# Patient Record
Sex: MALE | Race: WHITE | Employment: FULL TIME | URBAN - METROPOLITAN AREA
[De-identification: names, ages, dates, MRNs, and addresses within clinical notes are randomized per-mention and may not be internally consistent; named-entity substitution may affect disease eponyms.]

---

## 2017-02-15 PROBLEM — M54.32 CHRONIC SCIATICA OF LEFT SIDE: Status: ACTIVE | Noted: 2017-02-15

## 2017-02-16 ENCOUNTER — HOSPITAL ENCOUNTER (OUTPATIENT)
Dept: GENERAL RADIOLOGY | Age: 37
Discharge: HOME | End: 2017-02-16
Payer: COMMERCIAL

## 2017-02-16 DIAGNOSIS — M54.32 CHRONIC SCIATICA OF LEFT SIDE: ICD-10-CM

## 2017-02-16 PROCEDURE — 72100 X-RAY EXAM L-S SPINE 2/3 VWS: CPT

## 2017-02-17 NOTE — PROGRESS NOTES
Let know mild degenerative changes at L5-S1. Otherwise, looks ok.   If not better, pls get in with PT.

## 2017-03-16 ENCOUNTER — HOSPITAL ENCOUNTER (OUTPATIENT)
Dept: PHYSICAL THERAPY | Age: 37
Discharge: HOME | End: 2017-03-16
Payer: COMMERCIAL

## 2017-03-16 DIAGNOSIS — G89.29 CHRONIC LEFT-SIDED LOW BACK PAIN WITH LEFT-SIDED SCIATICA: ICD-10-CM

## 2017-03-16 DIAGNOSIS — M54.42 CHRONIC LEFT-SIDED LOW BACK PAIN WITH LEFT-SIDED SCIATICA: ICD-10-CM

## 2017-03-16 DIAGNOSIS — M54.16 LUMBAR RADICULOPATHY: ICD-10-CM

## 2017-03-16 DIAGNOSIS — R29.898 WEAKNESS OF LEFT LOWER EXTREMITY: ICD-10-CM

## 2017-03-16 PROCEDURE — 97110 THERAPEUTIC EXERCISES: CPT | Performed by: PHYSICAL THERAPIST

## 2017-03-16 PROCEDURE — 97014 ELECTRIC STIMULATION THERAPY: CPT | Performed by: PHYSICAL THERAPIST

## 2017-03-16 PROCEDURE — 97162 PT EVAL MOD COMPLEX 30 MIN: CPT | Performed by: PHYSICAL THERAPIST

## 2017-03-16 PROCEDURE — 97140 MANUAL THERAPY 1/> REGIONS: CPT | Performed by: PHYSICAL THERAPIST

## 2017-03-16 NOTE — PROGRESS NOTES
[facility]  : 1980 [address] [place] [address].  [phone]   [phone]        OUTPATIENT PHYSICAL THERAPY:Initial Assessment 3/16/2017      ICD-10: Treatment Diagnosis: Low back pain (M54.5)  Radiculopathy, lumbar region (M54.16) Left  Precautions/Allergies:   Review of patient's allergies indicates no known allergies. Fall Risk Score: 2 (? 5 = High Risk)  MD Orders: Evaluate and treat low back pain with L-sided sciatica MEDICAL/REFERRING DIAGNOSIS:  Chronic left-sided low back pain with left-sided sciatica [M54.42, G89.29]  Lumbar radiculopathy [M54.16]  Weakness of left lower extremity [M62.81]   DATE OF ONSET: ~2 months ago (2017)  REFERRING PHYSICIAN: DO MARTINEZ Bautista PHYSICIAN APPOINTMENT: TBD     INITIAL ASSESSMENT:  Mr. Bacon is a 40 y/o male presenting with low back pain with L radicular pain into L buttocks. Pt presents with pain and difficulty with prolonged sitting, standing, transfers, and initially walking after transfer. Pt exhibits limited trunk and L hip mobility with decreased LLE strength. Pt will benefit from aquatic therapy to include deep well traction and then increase functional mobility and increase core/hip/LE strength in a setting that will decompress spine. PROBLEM LIST (Impacting functional limitations):  1. Decreased Strength  2. Decreased ADL/Functional Activities  3. Decreased Transfer Abilities  4. Decreased Ambulation Ability/Technique  5. Increased Pain  6. Decreased Activity Tolerance  7. Decreased Flexibility/Joint Mobility  8. Decreased [place] with Home Exercise Program INTERVENTIONS PLANNED:  1. Balance Exercise  2. Cold  3. Gait Training  4. Heat  5. Home Exercise Program (HEP)  6. Manual Therapy  7. Neuromuscular Re-education/Strengthening  8. Range of Motion (ROM)  9. Therapeutic Activites  10. Therapeutic Exercise/Strengthening  11.  Transcutaneous Electrical Nerve Stimulation (TENS)  12. Transfer Training  13. Ultrasound (US)  14. aquatics   TREATMENT PLAN:  Effective Dates: 3/16/17 TO 06/08/17. Frequency/Duration: 2-3 times a week for 12 weeks  GOALS: (Goals have been discussed and agreed upon with patient.)  Short-Term Functional Goals: Time Frame: 3 weeks  1. Pt will be able to independent with HEP without increased symptoms. 2. Pt will be able to sit to stand without use of UEs. 3. Pt will be able to report at least 25% less symptoms with back and left buttock pain with daily activities. 4. Pt will be able to sit to stand then ambulate with normalized gait (per report) after sitting to eat for 30 minutes. Discharge Goals: Time Frame: 8-12 weeks  1. Pt will be able to sit in his truck for an hour and then transfer out with symptoms 0-1/10 symptoms. 2. Pt will be able to increase LLE strength=RLE strength for lifting and squatting. 3. Pt will be able to increase LLE straight leg raise to at least 70 ° noting improving functional mobility. 4. Pt will be able to increase trunk AROM to [facility] for ADLS, donning shoes, etc.   Rehabilitation Potential For Stated Goals: Good  Regarding Jean Lui's therapy, I certify that the treatment plan above will be carried out by a therapist or under their direction. Thank you for this referral,  Emma Brian, PT       Referring Physician Signature: Kemi Manning DO              Date                      HISTORY:   History of Present Injury/Illness (Reason for Referral):  Pt reports insidious onset of LBP about 2 months ago. Pt does recall feeling increased pain while playing basketball. He felt like he hyperextended his L knee and felt pain in buttock area. Pt reports severe pain in L buttock area that is a pulling, burning sensation. Pt denies radiation past knee. Pt reports pain increases with sitting, standing, and is really bad while moving sit to stand after sitting for any length of time.   Pt reports that medicine has not changed his pain. Pt reports no previous pain like this in the past; however, pt has had episodes of what he describes as hip pain (back pain) causing postural shifts. Pt states with stretching and/or chiropractor he has worked through those episodes. Past Medical History/Comorbidities:   Mr. Sepideh Campbell  has a past medical history of Chronic sciatica of left side (2/15/2017). Mr. Sepideh Campbell  has no past surgical history on file. 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 , Disp: 60 Tab, Rfl: 2    tiZANidine (ZANAFLEX) 4 mg tablet, Take 1 Tab by mouth nightly as needed. , Disp: 30 Tab, Rfl: 1   Date Last Reviewed:  3/16/2017   # of Personal Factors/Comorbidities that affect the Plan of Care: 1-2: MODERATE COMPLEXITY   EXAMINATION:   Observation/Orthostatic Postural Assessment:  Decreased lumbar lordosis mildly; guarded with trunk          Palpation:          Tight and tender with L piriformis; no pain with palpation of hamstrings, mild pain with palpation L5-S1 area L   ROM:      Trunk flexion to knees, guarded and limited by pain  Trunk extension increases pain L buttock and guarded and limited with pain in standing but WFL in prone on table  Trunk sidebending WFL  Trunk Rotation to L painful, mild limitiationbut WNL to R    Hips WFL except L hamstring limited to    40 ° with Straight Leg Raise (SLR); R SLR 75 °    Knees and ankles WFL                  Strength:     Date:  3/16/17 Date:   Date:     LE MMT Left Right Left Right Left Right   Hip Flex (L1/L2) 4-/5 pain  5/5       Hip Abd (glut med) 4-/5  5/5       Quad    ( L3/4) 4/5 but very difficult to perform LAQ due to pain 5/5       Hamstring 4+/5 5/5       Anterior Tib (L4/L5) 5/5 5/5       Gastroc (S1/2)         EHL (L5) 5/5 5/5         Special Tests:  SLR (+ L), Hip Scour (-), LILY (-), Slump (+ L)  Neurological Screen: denies numbness and tingling but burning L buttock and proximal posterior L thigh  Functional Mobility:         Gait/Ambulation:  Upon transfer sit to stand, first few steps antalgic, guarded trunk and decreased stance time LLE        Transfers:  Slow and requires use of hands but Mod I  Balance:  WFL with SLS   Body Structures Involved:  1. Nerves  2. Bones  3. Joints  4. Muscles  5. Ligaments Body Functions Affected:  1. Sensory/Pain  2. Neuromusculoskeletal  3. Movement Related Activities and Participation Affected:  1. General Tasks and Demands  2. Mobility  3. Self Care  4. Domestic Life  5. Community, Social and [place]   # of elements that affect the Plan of Care: 4+: HIGH COMPLEXITY   CLINICAL PRESENTATION:   Presentation: Evolving clinical presentation with changing clinical characteristics: MODERATE COMPLEXITY   CLINICAL DECISION MAKING:   Outcome Measure: Tool Used: Modified Oswestry Low Back Pain Questionnaire  Score:  Initial: 8/50  Most Recent: X/50 (Date: -- )   Interpretation of Score: Each section is scored on a 0-5 scale, 5 representing the greatest disability. The scores of each section are added together for a total score of 50. Medical Necessity:   · Patient is expected to demonstrate progress in strength, range of motion and functional technique to decrease assistance required with transfers. Reason for Services/Other Comments:  · Patient continues to require skilled intervention due to needing further therapy to decompress spine and further instruction and advancements of core/LE strengthening and stretching.    Use of outcome tool(s) and clinical judgement create a POC that gives a: Questionable prediction of patient's progress: MODERATE COMPLEXITY   TREATMENT:   (In addition to Assessment/Re-Assessment sessions the following treatments were rendered)    Therapeutic Exercise: (see flow sheet below for minutes):  Exercises per grid below to improve mobility and strength. Required minimal visual and verbal cues to promote proper body mechanics. Aquatic Therapy (see flow sheet below for minutes): Aquatic treatment performed per flow grid for Decreased muscle strength, Decreased range of motion and Decompression. Cues provided for technique. Date: 3/16/17       Modalities: 15 minutes       Ice/ pre-mod 2 channels e-stim L lumbar and L piriformis area 15 minutes pt in prone                       Manual Therapy: 10 minutes       Soft tissue mobilization L piriformis  10 minutes; pt in prone with pillows under abdomen                       Aquatic Exercise: next       Gait F/B/S/M        Heel/Toe walk        4-way        PF/DF        Hamstring Curls        Hip Flexion with knee ext.   (rockette)        Squats          SLR march        Lunges        Hip circles        Hip horizontal abduction/adduction        Core:          Core:        Deep well bike        Deep well jumping eugene        Deep well scissors        Deep well:  traction                Hamstring Stretch        Step Lunge        Piriformis stretch        PF Stretch        Balance: Single leg Stance        Balance: Tandem                          Therapeutic Exercise: 20 minutes       Lower trunk rotations x15       Single knee to chest 2 H 20       Figure 4 piriformis 3 H 20       Supine hamstring active stretch 2 H 30       Nerve glide with LLE SLR with ankle pumps using strap 2 H 20       Pelvic tilts x15       bridging x15       Prone press-ups 5 H 10               F=forward  B=Backward  S=sidesteps  M=marches    H=hold    Manual: (see flow sheet above for minutes) see above to decrease tightness with L piriformis and increase flexibility  Modalities: (see flow sheet above for minutes) see above to decrease pain and inflammation; pt monitored and skin WNL afterwards    HEP: see above and pt issued written HEP  Treatment/Session Assessment:  Pt was seen for initial evaluation followed by treatment above. Pt had decreased symptoms with stretching and manual therapy. Pt will benefit from deep well aquatic traction next session and pt agreeable. · Pain/ Symptoms: Initial:   7/10 L buttock/posterior thigh burning and tightness worse after sitting and standing Post Session:  \"I feel less pain after I've stretched. Reports minimal pain upon leaving. ·   Compliance with Program/Exercises: Will assess as treatment progresses. · Recommendations/Intent for next treatment session: \"Next visit will focus on beginning aquatics\".   Total Treatment Duration:  PT Patient Time In/Time Out  Time In: 1500  Time Out: 1610     Emma Brian, PT

## 2017-03-16 NOTE — PROGRESS NOTES
Ambulatory/Rehab Services H2 Model Falls Risk Assessment    Risk Factor Pts. ·   Confusion/Disorientation/Impulsivity  []    4 ·   Symptomatic Depression  []   2 ·   Altered Elimination  []   1 ·   Dizziness/Vertigo  []   1 ·   Gender (Male)  [x]   1 ·   Any administered antiepileptics (anticonvulsants):  []   2 ·   Any administered benzodiazepines:  []   1 ·   Visual Impairment (specify):  []   1 ·   Portable Oxygen Use  []   1 ·   Orthostatic ? BP  []   1 ·   History of Recent Falls (within 3 mos.)  []   5     Ability to Rise from Chair (choose one) Pts. ·   Ability to rise in a single movement  []   0 ·   Pushes up, successful in one attempt  [x]   1 ·   Multiple attempts, but successful  []   3 ·   Unable to rise without assistance  []   4   Total: (5 or greater = High Risk) 2     Falls Prevention Plan:   []                Physical Limitations to Exercise (specify):   []                Mobility Assistance Device (type):   []                Exercise/Equipment Adaptation (specify):    ©2010 [facility] [address] Patent #[id number].  Federal Law prohibits the replication, distribution or use without written permission from [facility]

## 2017-03-30 ENCOUNTER — HOSPITAL ENCOUNTER (OUTPATIENT)
Dept: MRI IMAGING | Age: 37
Discharge: HOME | End: 2017-03-30
Attending: FAMILY MEDICINE
Payer: COMMERCIAL

## 2017-03-30 DIAGNOSIS — M54.42 CHRONIC LEFT-SIDED LOW BACK PAIN WITH LEFT-SIDED SCIATICA: ICD-10-CM

## 2017-03-30 DIAGNOSIS — M54.16 LUMBAR RADICULOPATHY: ICD-10-CM

## 2017-03-30 DIAGNOSIS — R29.898 WEAKNESS OF LEFT LOWER EXTREMITY: ICD-10-CM

## 2017-03-30 DIAGNOSIS — G89.29 CHRONIC LEFT-SIDED LOW BACK PAIN WITH LEFT-SIDED SCIATICA: ICD-10-CM

## 2017-03-30 PROCEDURE — 72148 MRI LUMBAR SPINE W/O DYE: CPT

## 2017-03-31 NOTE — PROGRESS NOTES
Let know:  1. Moderate size left subarticular zone disc extrusion at L5-S1 with severe  impingement upon the left S1 nerve root. There is also mild right neural  foraminal narrowing at this level. 2. Disc herniations also at L3-4 and L4-5 as described. There is resultant mild  central canal stenosis at the L3-4 level. --Please get in with Dr. Jose Guadalupe Higuera (neurosurgeon) to discuss tx options.

## 2017-04-11 NOTE — PROGRESS NOTES
Donna Jaimes Arleen  : 1980 [address] [facility],[address] @ [address]  [facility] [address]  [phone]   [phone]        OUTPATIENT PHYSICAL THERAPY:Discontinuation Summary 2017      ICD-10: Treatment Diagnosis: Low back pain (M54.5)  Radiculopathy, lumbar region (M54.16) Left  Precautions/Allergies:   Review of patient's allergies indicates no known allergies. Fall Risk Score: 2 (? 5 = High Risk)  MD Orders: Evaluate and treat low back pain with L-sided sciatica MEDICAL/REFERRING DIAGNOSIS:  Chronic left-sided low back pain with left-sided sciatica [M54.42, G89.29]  Lumbar radiculopathy [M54.16]  Weakness of left lower extremity [M62.81]   DATE OF ONSET: ~2 months ago (2017)  REFERRING PHYSICIAN: DO MARTINEZ Sawyer PHYSICIAN APPOINTMENT: TBD     INITIAL ASSESSMENT:  Mr. Bacon is a 40 y/o male presenting with low back pain with L radicular pain into L buttocks. Pt presents with pain and difficulty with prolonged sitting, standing, transfers, and initially walking after transfer. Pt exhibits limited trunk and L hip mobility with decreased LLE strength. Pt will benefit from aquatic therapy to include deep well traction and then increase functional mobility and increase core/hip/LE strength in a setting that will decompress spine. 17: Pt was seen for initial evaluation and did not return for further visits. Discontinue at this time. PROBLEM LIST (Impacting functional limitations):  1. Decreased Strength  2. Decreased ADL/Functional Activities  3. Decreased Transfer Abilities  4. Decreased Ambulation Ability/Technique  5. Increased Pain  6. Decreased Activity Tolerance  7. Decreased Flexibility/Joint Mobility  8. Decreased [place] with Home Exercise Program INTERVENTIONS PLANNED:  1. Balance Exercise  2. Cold  3. Gait Training  4. Heat  5. Home Exercise Program (HEP)  6. Manual Therapy  7.  Neuromuscular Re-education/Strengthening  8. Range of Motion (ROM)  9. Therapeutic Activites  10. Therapeutic Exercise/Strengthening  11. Transcutaneous Electrical Nerve Stimulation (TENS)  12. Transfer Training  13. Ultrasound (US)  14. aquatics   TREATMENT PLAN:  Effective Dates: 3/16/17 TO 06/08/17. Frequency/Duration: 2-3 times a week for 12 weeks  GOALS: (Goals have been discussed and agreed upon with patient.)  Short-Term Functional Goals: Time Frame: 3 weeks  1. Pt will be able to independent with HEP without increased symptoms. 2. Pt will be able to sit to stand without use of UEs. 3. Pt will be able to report at least 25% less symptoms with back and left buttock pain with daily activities. 4. Pt will be able to sit to stand then ambulate with normalized gait (per report) after sitting to eat for 30 minutes. Discharge Goals: Time Frame: 8-12 weeks  1. Pt will be able to sit in his truck for an hour and then transfer out with symptoms 0-1/10 symptoms. 2. Pt will be able to increase LLE strength=RLE strength for lifting and squatting. 3. Pt will be able to increase LLE straight leg raise to at least 70 ° noting improving functional mobility. 4. Pt will be able to increase trunk AROM to [facility] for ADLS, donning shoes, etc.   Rehabilitation Potential For Stated Goals: Good  Regarding Ashok Lui's therapy, I certify that the treatment plan above will be carried out by a therapist or under their direction. Thank you for this referral,  Emma Brian, PT       Referring Physician Signature: Susanna Ceron DO              Date                      HISTORY:   History of Present Injury/Illness (Reason for Referral):  Pt reports insidious onset of LBP about 2 months ago. Pt does recall feeling increased pain while playing basketball. He felt like he hyperextended his L knee and felt pain in buttock area. Pt reports severe pain in L buttock area that is a pulling, burning sensation. Pt denies radiation past knee. Pt reports pain increases with sitting, standing, and is really bad while moving sit to stand after sitting for any length of time. Pt reports that medicine has not changed his pain. Pt reports no previous pain like this in the past; however, pt has had episodes of what he describes as hip pain (back pain) causing postural shifts. Pt states with stretching and/or chiropractor he has worked through those episodes. Past Medical History/Comorbidities:   Mr. Betty Pearson  has a past medical history of Chronic sciatica of left side (2/15/2017). Mr. Betty Pearson  has no past surgical history on file. 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 , Disp: 60 Tab, Rfl: 2    tiZANidine (ZANAFLEX) 4 mg tablet, Take 1 Tab by mouth nightly as needed. , Disp: 30 Tab, Rfl: 1   Date Last Reviewed:  3/16/2017   EXAMINATION:   Observation/Orthostatic Postural Assessment:  Decreased lumbar lordosis mildly; guarded with trunk          Palpation:          Tight and tender with L piriformis; no pain with palpation of hamstrings, mild pain with palpation L5-S1 area L   ROM:      Trunk flexion to knees, guarded and limited by pain  Trunk extension increases pain L buttock and guarded and limited with pain in standing but WFL in prone on table  Trunk sidebending WFL  Trunk Rotation to L painful, mild limitiationbut WNL to R    Hips WFL except L hamstring limited to    40 ° with Straight Leg Raise (SLR); R SLR 75 °    Knees and ankles WFL                  Strength:     Date:  3/16/17 Date:   Date:     LE MMT Left Right Left Right Left Right   Hip Flex (L1/L2) 4-/5 pain  5/5       Hip Abd (glut med) 4-/5  5/5       Quad    ( L3/4) 4/5 but very difficult to perform LAQ due to pain 5/5       Hamstring 4+/5 5/5       Anterior Tib (L4/L5) 5/5 5/5       Gastroc (S1/2)         EHL (L5) 5/5 5/5         Special Tests:  SLR (+ L), Hip Scour (-), LILY (-), Slump (+ L)  Neurological Screen: denies numbness and tingling but burning L buttock and proximal posterior L thigh  Functional Mobility:         Gait/Ambulation:  Upon transfer sit to stand, first few steps antalgic, guarded trunk and decreased stance time LLE        Transfers:  Slow and requires use of hands but Mod I  Balance:  WFL with SLS   Body Structures Involved:  1. Nerves  2. Bones  3. Joints  4. Muscles  5. Ligaments Body Functions Affected:  1. Sensory/Pain  2. Neuromusculoskeletal  3. Movement Related Activities and Participation Affected:  1. General Tasks and Demands  2. Mobility  3. Self Care  4. Domestic Life  5. Community, Social and Civic Life   CLINICAL DECISION MAKING:   Outcome Measure: Tool Used: Modified Oswestry Low Back Pain Questionnaire  Score:  Initial: 8/50  Most Recent: X/50 (Date: -- )   Interpretation of Score: Each section is scored on a 0-5 scale, 5 representing the greatest disability. The scores of each section are added together for a total score of 50. Medical Necessity:   · Patient is expected to demonstrate progress in strength, range of motion and functional technique to decrease assistance required with transfers. Reason for Services/Other Comments:  · Patient continues to require skilled intervention due to needing further therapy to decompress spine and further instruction and advancements of core/LE strengthening and stretching. TREATMENT:   (In addition to Assessment/Re-Assessment sessions the following treatments were rendered)    Therapeutic Exercise: (see flow sheet below for minutes):  Exercises per grid below to improve mobility and strength.   Required minimal visual and verbal cues to promote proper body mechanics. Aquatic Therapy (see flow sheet below for minutes): Aquatic treatment performed per flow grid for Decreased muscle strength, Decreased range of motion and Decompression. Cues provided for technique. Date: 3/16/17       Modalities: 15 minutes       Ice/ pre-mod 2 channels e-stim L lumbar and L piriformis area 15 minutes pt in prone                       Manual Therapy: 10 minutes       Soft tissue mobilization L piriformis  10 minutes; pt in prone with pillows under abdomen                       Aquatic Exercise: next       Gait F/B/S/M        Heel/Toe walk        4-way        PF/DF        Hamstring Curls        Hip Flexion with knee ext. (rockette)        Squats          SLR march        Lunges        Hip circles        Hip horizontal abduction/adduction        Core:          Core:        Deep well bike        Deep well jumping eugene        Deep well scissors        Deep well:  traction                Hamstring Stretch        Step Lunge        Piriformis stretch        PF Stretch        Balance: Single leg Stance        Balance: Tandem                          Therapeutic Exercise: 20 minutes       Lower trunk rotations x15       Single knee to chest 2 H 20       Figure 4 piriformis 3 H 20       Supine hamstring active stretch 2 H 30       Nerve glide with LLE SLR with ankle pumps using strap 2 H 20       Pelvic tilts x15       bridging x15       Prone press-ups 5 H 10               F=forward  B=Backward  S=sidesteps  M=marches    H=hold    Manual: (see flow sheet above for minutes) see above to decrease tightness with L piriformis and increase flexibility  Modalities: (see flow sheet above for minutes) see above to decrease pain and inflammation; pt monitored and skin WNL afterwards    HEP: see above and pt issued written HEP  Treatment/Session Assessment:  Pt was seen for initial evaluation and never returned for PT.    Recommendations/Intent for next treatment session: Discontinue PT secondary to pt not returning     Emma Brian, PT

## 2017-04-24 PROBLEM — M51.27 DISPLACEMENT OF INTERVERTEBRAL DISC OF LUMBOSACRAL REGION: Status: ACTIVE | Noted: 2017-04-24

## 2017-04-24 PROBLEM — M54.16 LUMBAR RADICULOPATHY: Status: ACTIVE | Noted: 2017-04-24

## 2020-02-19 ENCOUNTER — HOSPITAL ENCOUNTER (OUTPATIENT)
Dept: MRI IMAGING | Age: 40
Discharge: HOME | End: 2020-02-19
Attending: FAMILY MEDICINE
Payer: COMMERCIAL

## 2020-02-19 DIAGNOSIS — M54.9 BACK PAIN WITH SCIATICA: ICD-10-CM

## 2020-02-19 DIAGNOSIS — M54.30 BACK PAIN WITH SCIATICA: ICD-10-CM

## 2020-02-19 PROCEDURE — 72148 MRI LUMBAR SPINE W/O DYE: CPT

## 2020-02-20 NOTE — PROGRESS NOTES
MRI of the lumbar spine does show disc herniation and compressing of the nerves of the spine. I would like to get you back in with neurosurgery at this time for further management. I will place a referral, you last saw Dr. Major Rubinstein. Would you like to go back to this provider?

## 2020-02-20 NOTE — PROGRESS NOTES
Patient notified of results and instructions, per Tyrone's note, and voiced understanding. He said he saw Dr Augie Eagle last for his back. He also said that [place] had just called him with the results.

## 2020-02-25 PROBLEM — M51.26 HNP (HERNIATED NUCLEUS PULPOSUS), LUMBAR: Status: ACTIVE | Noted: 2017-04-24
